# Patient Record
Sex: FEMALE | Race: WHITE | Employment: FULL TIME | URBAN - METROPOLITAN AREA
[De-identification: names, ages, dates, MRNs, and addresses within clinical notes are randomized per-mention and may not be internally consistent; named-entity substitution may affect disease eponyms.]

---
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
## 2025-02-04 ENCOUNTER — OFFICE VISIT (OUTPATIENT)
Dept: URGENT CARE | Age: 20
End: 2025-02-04

## 2025-02-04 ENCOUNTER — HOSPITAL ENCOUNTER (EMERGENCY)
Age: 20
Discharge: HOME | End: 2025-02-04

## 2025-02-04 VITALS
SYSTOLIC BLOOD PRESSURE: 122 MMHG | HEART RATE: 89 BPM | OXYGEN SATURATION: 98 % | TEMPERATURE: 98.1 F | DIASTOLIC BLOOD PRESSURE: 69 MMHG | RESPIRATION RATE: 20 BRPM

## 2025-02-04 DIAGNOSIS — L01.00 IMPETIGO: Primary | ICD-10-CM

## 2025-02-04 RX ORDER — MUPIROCIN 20 MG/G
OINTMENT TOPICAL
Qty: 22 G | Refills: 0 | Status: SHIPPED | OUTPATIENT
Start: 2025-02-04 | End: 2025-02-14

## 2025-02-04 ASSESSMENT — ENCOUNTER SYMPTOMS
WOUND: 0
DIARRHEA: 0
VOMITING: 0
FEVER: 0
CHILLS: 0
NAUSEA: 0
SHORTNESS OF BREATH: 0
WHEEZING: 0

## 2025-02-04 NOTE — PATIENT INSTRUCTIONS
Start ointment    If rash does not improve by end of treatment or worsens return to clinic    Otherwise follow up with pcp.

## 2025-02-04 NOTE — LETTER
February 6, 2025     Patient: Jossue Stroud   YOB: 2005   Date of Visit: 2/4/2025       To Whom It May Concern:    Jossue Stroud was seen in my clinic on 2/4/2025 at 5:10 pm. Please excuse Jossue for her absence from work. She may return 2/6/25.        Sincerely,         No Degroot PA-C        CC: No Recipients

## 2025-02-04 NOTE — Clinical Note
February 4, 2025     Patient: Jossue Stroud   YOB: 2005   Date of Visit: 2/4/2025       To Whom It May Concern:    Jossue Stroud was seen in my clinic on 2/4/2025 at 5:10 pm. Please excuse Jossue for her absence from school on this day to make the appointment.    If you have any questions or concerns, please don't hesitate to call.         Sincerely,         No Degroot PA-C        CC: No Recipients

## 2025-02-04 NOTE — LETTER
February 4, 2025     Patient: Jossue Stroud   YOB: 2005   Date of Visit: 2/4/2025       To Whom It May Concern:    Jossue Stroud was seen in my clinic on 2/4/2025. Please excuse Jossue for her absence from work on this day. May return 2/5/2025.          Sincerely,         No Degroot PA-C        CC: No Recipients

## 2025-02-04 NOTE — PROGRESS NOTES
Subjective   Patient ID: Jossue Stroud is a 19 y.o. female. They present today with a chief complaint of Rash (Rash to chin for 2-3 days).    History of Present Illness  Patient presents with rash on chin for over 2-3 days. Denies pain, discharge, drainage. Denies any other rashes. Denies fever, chills, sweats. Denies n/v/d. Denies chest pain, sob. Denies uri symptoms including cough, congestion, sore throat.           Past Medical History  Allergies as of 02/04/2025    (No Known Allergies)       (Not in a hospital admission)       No past medical history on file.    No past surgical history on file.         Review of Systems  Review of Systems   Constitutional:  Negative for chills and fever.   Respiratory:  Negative for shortness of breath and wheezing.    Cardiovascular:  Negative for chest pain.   Gastrointestinal:  Negative for diarrhea, nausea and vomiting.   Skin:  Positive for rash. Negative for wound.                                  Objective    Vitals:    02/04/25 1724   BP: 122/69   Pulse: 89   Resp: 20   Temp: 36.7 °C (98.1 °F)   SpO2: 98%     No LMP recorded.    Physical Exam  Constitutional:       General: She is not in acute distress.     Appearance: Normal appearance. She is not ill-appearing.   HENT:      Head: Normocephalic and atraumatic.   Eyes:      Extraocular Movements: Extraocular movements intact.      Pupils: Pupils are equal, round, and reactive to light.   Pulmonary:      Effort: Pulmonary effort is normal. No respiratory distress.      Breath sounds: Normal breath sounds. No wheezing.   Musculoskeletal:      Cervical back: Normal range of motion.   Skin:     Findings: Rash present. Rash is crusting.      Comments: Erythematous rash, small lesions with honey colored crusting and clear discharge noted on chin. No other skin findings.    Neurological:      Mental Status: She is alert.         Procedures    Point of Care Test & Imaging Results from this visit  No results found for  this visit on 02/04/25.   No results found.    Diagnostic study results (if any) were reviewed by No Degroot PA-C.    Assessment/Plan   Allergies, medications, history, and pertinent labs/EKGs/Imaging reviewed by No Degroot PA-C.     Medical Decision Making  MDM- History and exam consistent with impetigo. No evidence of sepsis or other complications. Plan is for topical antibiotic therapy and symptomatic support at home. Patient/family understand to return or go to the ED if symptoms change or worsen. Otherwise follow-up with PCP. Patient verbalized understanding and agrees with plan.       Orders and Diagnoses  Diagnoses and all orders for this visit:  Impetigo  -     mupirocin (Bactroban) 2 % ointment; Apply topically 3 times a day for 10 days.      Medical Admin Record      Patient disposition: Home    Electronically signed by No Degroot PA-C  5:33 PM